# Patient Record
Sex: FEMALE | Race: WHITE | NOT HISPANIC OR LATINO | URBAN - NONMETROPOLITAN AREA
[De-identification: names, ages, dates, MRNs, and addresses within clinical notes are randomized per-mention and may not be internally consistent; named-entity substitution may affect disease eponyms.]

---

## 2021-08-26 ENCOUNTER — ERX REFILL RESPONSE (OUTPATIENT)
Dept: URBAN - NONMETROPOLITAN AREA CLINIC 2 | Facility: CLINIC | Age: 71
End: 2021-08-26

## 2021-08-26 ENCOUNTER — LAB OUTSIDE AN ENCOUNTER (OUTPATIENT)
Dept: URBAN - NONMETROPOLITAN AREA CLINIC 2 | Facility: CLINIC | Age: 71
End: 2021-08-26

## 2021-08-26 ENCOUNTER — OFFICE VISIT (OUTPATIENT)
Dept: URBAN - NONMETROPOLITAN AREA CLINIC 2 | Facility: CLINIC | Age: 71
End: 2021-08-26
Payer: MEDICARE

## 2021-08-26 ENCOUNTER — WEB ENCOUNTER (OUTPATIENT)
Dept: URBAN - NONMETROPOLITAN AREA CLINIC 2 | Facility: CLINIC | Age: 71
End: 2021-08-26

## 2021-08-26 VITALS
TEMPERATURE: 97.5 F | WEIGHT: 119 LBS | HEART RATE: 65 BPM | SYSTOLIC BLOOD PRESSURE: 164 MMHG | DIASTOLIC BLOOD PRESSURE: 75 MMHG | HEIGHT: 63 IN | BODY MASS INDEX: 21.09 KG/M2

## 2021-08-26 DIAGNOSIS — C18.9 COLON CANCER: ICD-10-CM

## 2021-08-26 DIAGNOSIS — B19.20 HEPATITIS C: ICD-10-CM

## 2021-08-26 PROCEDURE — 99214 OFFICE O/P EST MOD 30 MIN: CPT | Performed by: NURSE PRACTITIONER

## 2021-08-26 RX ORDER — SODIUM, POTASSIUM,MAG SULFATES 17.5-3.13G
354 ML AS DIRECTED SOLUTION, RECONSTITUTED, ORAL ORAL ONCE
Qty: 354 MILLILITER | Refills: 0 | OUTPATIENT

## 2021-08-26 RX ORDER — SODIUM PICOSULFATE, MAGNESIUM OXIDE, AND ANHYDROUS CITRIC ACID 10; 3.5; 12 MG/160ML; G/160ML; G/160ML
160 ML LIQUID ORAL
Qty: 320 MILLILITER | Refills: 0 | OUTPATIENT

## 2021-08-26 RX ORDER — SODIUM PICOSULFATE, MAGNESIUM OXIDE, AND ANHYDROUS CITRIC ACID 10; 3.5; 12 MG/160ML; G/160ML; G/160ML
160 ML LIQUID ORAL
Qty: 320 MILLILITER | Refills: 0 | OUTPATIENT
Start: 2021-08-26 | End: 2021-08-27

## 2021-08-26 NOTE — HPI-TODAY'S VISIT:
Rohan Zamudio is a 66 YO who presents to establish a local Gastroenterologist. She has a history of Colon Cancer diagnosed in 2014 after a screening colonoscopy with an ascending mass s/p right hemicolectomy. She had a repeat colonoscoy in April of 2015 which was normal per her report and was told to repeat in 3 years. She also has a history of breast cancer s/p chemotherapy, mastectomy, and reconstruction in the early 1990s. She has not been regular about her mammogram screenings. She has a family history of breast cancer as well. She has not undergone any genetic screening. Today she has no specific complaints and is here to establish care. MB  8/26/2021 Mrs. Dunbar presents for follow-up of colon cancer.  Since her last visit she is status post colonoscopy by Dr. Yusuf in 2018 with 1 hyperplastic polyp and intact anastomosis.  She denies any GI complaints.  Her bowels are moving regularly.  She did recently have blood work with Dr. Connor that was normal.  Today she is due for surveillance colonoscopy, Dr. Yusuf did recommend a repeat at 3 years.  If this was normal she can likely go 5 years.  MB

## 2021-10-20 ENCOUNTER — OFFICE VISIT (OUTPATIENT)
Dept: URBAN - NONMETROPOLITAN AREA SURGERY CENTER 1 | Facility: SURGERY CENTER | Age: 71
End: 2021-10-20
Payer: MEDICARE

## 2021-10-20 DIAGNOSIS — Z85.038 H/O COLON CANCER, STAGE I: ICD-10-CM

## 2021-10-20 PROCEDURE — G0105 COLORECTAL SCRN; HI RISK IND: HCPCS | Performed by: INTERNAL MEDICINE

## 2021-10-20 PROCEDURE — G8907 PT DOC NO EVENTS ON DISCHARG: HCPCS | Performed by: INTERNAL MEDICINE

## 2023-06-09 ENCOUNTER — TELEPHONE ENCOUNTER (OUTPATIENT)
Dept: URBAN - NONMETROPOLITAN AREA CLINIC 2 | Facility: CLINIC | Age: 73
End: 2023-06-09

## 2023-06-12 ENCOUNTER — LAB OUTSIDE AN ENCOUNTER (OUTPATIENT)
Dept: URBAN - NONMETROPOLITAN AREA CLINIC 2 | Facility: CLINIC | Age: 73
End: 2023-06-12

## 2023-06-12 ENCOUNTER — OFFICE VISIT (OUTPATIENT)
Dept: URBAN - NONMETROPOLITAN AREA CLINIC 2 | Facility: CLINIC | Age: 73
End: 2023-06-12
Payer: MEDICARE

## 2023-06-12 ENCOUNTER — DASHBOARD ENCOUNTERS (OUTPATIENT)
Age: 73
End: 2023-06-12

## 2023-06-12 VITALS
HEART RATE: 81 BPM | HEIGHT: 63 IN | SYSTOLIC BLOOD PRESSURE: 132 MMHG | BODY MASS INDEX: 21.03 KG/M2 | DIASTOLIC BLOOD PRESSURE: 74 MMHG | TEMPERATURE: 98.5 F | WEIGHT: 118.7 LBS

## 2023-06-12 DIAGNOSIS — R10.32 LEFT LOWER QUADRANT ABDOMINAL PAIN: ICD-10-CM

## 2023-06-12 DIAGNOSIS — C18.9 COLON CANCER: ICD-10-CM

## 2023-06-12 DIAGNOSIS — K57.90 DIVERTICULOSIS: ICD-10-CM

## 2023-06-12 DIAGNOSIS — R19.4 CHANGE IN BOWEL HABIT: ICD-10-CM

## 2023-06-12 DIAGNOSIS — B19.20 HEPATITIS C: ICD-10-CM

## 2023-06-12 PROBLEM — 301716002: Status: ACTIVE | Noted: 2023-06-12

## 2023-06-12 PROBLEM — 397881000: Status: ACTIVE | Noted: 2023-06-12

## 2023-06-12 PROBLEM — 88111009: Status: ACTIVE | Noted: 2023-06-12

## 2023-06-12 PROCEDURE — 99214 OFFICE O/P EST MOD 30 MIN: CPT | Performed by: NURSE PRACTITIONER

## 2023-06-12 RX ORDER — SODIUM, POTASSIUM,MAG SULFATES 17.5-3.13G
354 ML AS DIRECTED SOLUTION, RECONSTITUTED, ORAL ORAL ONCE
Qty: 354 MILLILITER | Refills: 0 | Status: ACTIVE | COMMUNITY

## 2023-06-12 NOTE — HPI-TODAY'S VISIT:
2017 Lizz is a 64 YO who presents to establish a local Gastroenterologist. She has a history of Colon Cancer diagnosed in 2014 after a screening colonoscopy with an ascending mass s/p right hemicolectomy. She had a repeat colonoscoy in April of 2015 which was normal per her report and was told to repeat in 3 years. She also has a history of breast cancer s/p chemotherapy, mastectomy, and reconstruction in the early 1990s. She has not been regular about her mammogram screenings. She has a family history of breast cancer as well. She has not undergone any genetic screening. Today she has no specific complaints and is here to establish care. MB  8/26/2021 Mrs. Dunbar presents for follow-up of colon cancer.  Since her last visit she is status post colonoscopy by Dr. Yusuf in 2018 with 1 hyperplastic polyp and intact anastomosis.  She denies any GI complaints.  Her bowels are moving regularly.  She did recently have blood work with Dr. Connor that was normal.  Today she is due for surveillance colonoscopy, Dr. Yusuf did recommend a repeat at 3 years.  If this was normal she can likely go 5 years.  MB 6/12/2023 Lizz presents for evaluation of acute onset lower abdominal pain, change in bowel habits, and history of colon cancer.  Over the past several weeks she has had increased constipation with acute lower abdominal pain.  She is passing small narrow stools with bright red blood per rectum which she attributes to her hemorrhoids.  Her last colonoscopy was in 2021 with intact ileocolonic anastomosis and left-sided diverticulosis.  She is taking MiraLAX daily and able to pass stools, she had some nausea when this initially began but no vomiting.  She does have some tenderness in her left lower quadrant.  She has no history of diverticulitis.  Today she is not feeling well.  She agrees to labs and CT imaging to rule out acute diverticulitis versus recurrence of her disease.  If this is negative would recommend colonoscopy given acute change in her bowel habits.  I want her to continue the bowel regimen with MiraLAX daily for now, consider adding antibiotics per pending labs and CT imaging.  MB

## 2023-06-13 ENCOUNTER — TELEPHONE ENCOUNTER (OUTPATIENT)
Dept: URBAN - NONMETROPOLITAN AREA CLINIC 2 | Facility: CLINIC | Age: 73
End: 2023-06-13

## 2023-06-13 LAB
A/G RATIO: 1.3
ABSOLUTE BASOPHILS: 22
ABSOLUTE EOSINOPHILS: 62
ABSOLUTE LYMPHOCYTES: 1465
ABSOLUTE MONOCYTES: 295
ABSOLUTE NEUTROPHILS: 2556
ALBUMIN: 3.9
ALKALINE PHOSPHATASE: 43
ALT (SGPT): 11
AST (SGOT): 19
BASOPHILS: 0.5
BILIRUBIN, TOTAL: 0.5
BUN/CREATININE RATIO: 34
BUN: 20
C-REACTIVE PROTEIN, QUANT: 0.9
CALCIUM: 9
CARBON DIOXIDE, TOTAL: 30
CHLORIDE: 104
CREATININE: 0.59
EGFR: 95
EOSINOPHILS: 1.4
GLOBULIN, TOTAL: 3.1
GLUCOSE: 85
HEMATOCRIT: 39.1
HEMOGLOBIN: 13.2
LYMPHOCYTES: 33.3
MCH: 31.4
MCHC: 33.8
MCV: 93.1
MONOCYTES: 6.7
MPV: 9.8
NEUTROPHILS: 58.1
PLATELET COUNT: 222
POTASSIUM: 4.6
PROTEIN, TOTAL: 7
RDW: 11.5
RED BLOOD CELL COUNT: 4.2
SED RATE BY MODIFIED: 6
SODIUM: 142
WHITE BLOOD CELL COUNT: 4.4

## 2025-05-09 ENCOUNTER — OFFICE VISIT (OUTPATIENT)
Age: 75
End: 2025-05-09
Payer: COMMERCIAL

## 2025-05-09 ENCOUNTER — LAB OUTSIDE AN ENCOUNTER (OUTPATIENT)
Age: 75
End: 2025-05-09

## 2025-05-09 DIAGNOSIS — R63.4 WEIGHT LOSS: ICD-10-CM

## 2025-05-09 DIAGNOSIS — K57.90 DIVERTICULOSIS: ICD-10-CM

## 2025-05-09 DIAGNOSIS — B19.20 HEPATITIS C: ICD-10-CM

## 2025-05-09 DIAGNOSIS — R74.8 ELEVATED LIVER ENZYMES: ICD-10-CM

## 2025-05-09 DIAGNOSIS — C18.9 COLON CANCER: ICD-10-CM

## 2025-05-09 PROBLEM — 262285001: Status: ACTIVE | Noted: 2025-05-09

## 2025-05-09 PROBLEM — 707724006: Status: ACTIVE | Noted: 2025-05-09

## 2025-05-09 PROCEDURE — 99214 OFFICE O/P EST MOD 30 MIN: CPT | Performed by: NURSE PRACTITIONER

## 2025-05-09 RX ORDER — SODIUM, POTASSIUM,MAG SULFATES 17.5-3.13G
354 ML AS DIRECTED SOLUTION, RECONSTITUTED, ORAL ORAL ONCE
Qty: 354 MILLILITER | Refills: 0 | Status: ACTIVE | COMMUNITY

## 2025-05-09 NOTE — HPI-TODAY'S VISIT:
2017 Lizz is a 66 YO who presents to establish a local Gastroenterologist. She has a history of Colon Cancer diagnosed in 2014 after a screening colonoscopy with an ascending mass s/p right hemicolectomy. She had a repeat colonoscoy in April of 2015 which was normal per her report and was told to repeat in 3 years. She also has a history of breast cancer s/p chemotherapy, mastectomy, and reconstruction in the early 1990s. She has not been regular about her mammogram screenings. She has a family history of breast cancer as well. She has not undergone any genetic screening. Today she has no specific complaints and is here to establish care. MB  8/26/2021 Mrs. Dunbar presents for follow-up of colon cancer.  Since her last visit she is status post colonoscopy by Dr. Yusuf in 2018 with 1 hyperplastic polyp and intact anastomosis.  She denies any GI complaints.  Her bowels are moving regularly.  She did recently have blood work with Dr. Connor that was normal.  Today she is due for surveillance colonoscopy, Dr. Yusuf did recommend a repeat at 3 years.  If this was normal she can likely go 5 years.  MB 6/12/2023 Lizz presents for evaluation of acute onset lower abdominal pain, change in bowel habits, and history of colon cancer.  Over the past several weeks she has had increased constipation with acute lower abdominal pain.  She is passing small narrow stools with bright red blood per rectum which she attributes to her hemorrhoids.  Her last colonoscopy was in 2021 with intact ileocolonic anastomosis and left-sided diverticulosis.  She is taking MiraLAX daily and able to pass stools, she had some nausea when this initially began but no vomiting.  She does have some tenderness in her left lower quadrant.  She has no history of diverticulitis.  Today she is not feeling well.  She agrees to labs and CT imaging to rule out acute diverticulitis versus recurrence of her disease.  If this is negative would recommend colonoscopy given acute change in her bowel habits.  I want her to continue the bowel regimen with MiraLAX daily for now, consider adding antibiotics per pending labs and CT imaging.  MB 5/9/2025 Lizz presents for evaluation of weight loss.  Since beginning of the year she has lost about 7 pounds unintentionally.  She may relate this to a viral or infectious enteritis she got while in [place].  She has not been able to gain the weight back.  Her bowels have normalized.  She did have some epigastric abdominal pain associated with this acute episode however this seems to have resolved.  She is very anxious about her history of colon cancer.  Today we will schedule repeat colonoscopy.  MB

## 2025-07-02 ENCOUNTER — CLAIMS CREATED FROM THE CLAIM WINDOW (OUTPATIENT)
Dept: URBAN - NONMETROPOLITAN AREA SURGERY CENTER 1 | Facility: SURGERY CENTER | Age: 75
End: 2025-07-02
Payer: COMMERCIAL

## 2025-07-02 DIAGNOSIS — Z85.038 PERSONAL HISTORY OF OTHER MALIGNANT NEOPLASM OF LARGE INTESTINE: ICD-10-CM

## 2025-07-02 DIAGNOSIS — Z09 ENCOUNTER FOR FOLLOW-UP EXAMINATION AFTER COMPLETED TREATMENT FOR CONDITIONS OTHER THAN MALIGNANT NEOPLASM: ICD-10-CM

## 2025-07-02 DIAGNOSIS — E78.00 HYPERCHOLESTEREMIA: ICD-10-CM

## 2025-07-02 DIAGNOSIS — Z98.0 INTESTINAL ANASTOMOSIS PRESENT: ICD-10-CM

## 2025-07-02 DIAGNOSIS — Z85.038 PERSONAL HISTORY OF COLON CANCER: ICD-10-CM

## 2025-07-02 PROCEDURE — G0105 COLORECTAL SCRN; HI RISK IND: HCPCS | Performed by: INTERNAL MEDICINE

## 2025-07-02 PROCEDURE — 00811 ANES LWR INTST NDSC NOS: CPT | Performed by: NURSE ANESTHETIST, CERTIFIED REGISTERED

## 2025-07-02 RX ORDER — SODIUM, POTASSIUM,MAG SULFATES 17.5-3.13G
354 ML AS DIRECTED SOLUTION, RECONSTITUTED, ORAL ORAL ONCE
Qty: 354 MILLILITER | Refills: 0 | Status: ACTIVE | COMMUNITY

## 2025-08-07 ENCOUNTER — TELEPHONE ENCOUNTER (OUTPATIENT)
Dept: URBAN - NONMETROPOLITAN AREA CLINIC 2 | Facility: CLINIC | Age: 75
End: 2025-08-07

## 2025-08-21 ENCOUNTER — OFFICE VISIT (OUTPATIENT)
Dept: URBAN - NONMETROPOLITAN AREA CLINIC 2 | Facility: CLINIC | Age: 75
End: 2025-08-21
Payer: COMMERCIAL

## 2025-08-21 DIAGNOSIS — R74.8 ELEVATED LIVER ENZYMES: ICD-10-CM

## 2025-08-21 DIAGNOSIS — B19.20 HEPATITIS C: ICD-10-CM

## 2025-08-21 DIAGNOSIS — K57.92 DIVERTICULITIS: ICD-10-CM

## 2025-08-21 DIAGNOSIS — C18.9 COLON CANCER: ICD-10-CM

## 2025-08-21 PROBLEM — 307496006: Status: ACTIVE | Noted: 2025-08-21

## 2025-08-21 PROCEDURE — 99214 OFFICE O/P EST MOD 30 MIN: CPT | Performed by: NURSE PRACTITIONER

## 2025-08-21 RX ORDER — SODIUM, POTASSIUM,MAG SULFATES 17.5-3.13G
354 ML AS DIRECTED SOLUTION, RECONSTITUTED, ORAL ORAL ONCE
Qty: 354 MILLILITER | Refills: 0 | Status: ACTIVE | COMMUNITY